# Patient Record
Sex: FEMALE | URBAN - METROPOLITAN AREA
[De-identification: names, ages, dates, MRNs, and addresses within clinical notes are randomized per-mention and may not be internally consistent; named-entity substitution may affect disease eponyms.]

---

## 2023-03-07 ENCOUNTER — HOSPITAL ENCOUNTER (OUTPATIENT)
Dept: PHYSICAL THERAPY | Age: 23
Setting detail: THERAPIES SERIES
Discharge: HOME | End: 2023-03-07

## 2023-03-07 VITALS — HEIGHT: 65 IN | WEIGHT: 155 LBS | BODY MASS INDEX: 25.83 KG/M2

## 2023-03-07 NOTE — PROGRESS NOTES
Laura Perrin [address],  [facility], [address], [address] [place] [address]. 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 Roberta Keller understands basic nutrition principles and has undergone caloric restriction in the past. She is in a better place nutrition wise and is ready to tackle nutrition hurdles. Discussed need to adequately fuel body so that small calorie deficit can occur. Discussed need for moderate, slow, sustainable weight loss is caloric deficit of 100-300 calories/day. Discussed ways to to do this. ASSESSMENT  Biochemical Data, Medical Tests and Procedures:    No results found for: LABA1C  No results found for: EAG    No results found for: CHOL  No results found for: TRIG  No results found for: HDL  No results found for: LDLCALC  No results found for: LABVLDL  No results found for: CHOLHDLRATIO    No results found for: WBC, HGB, HCT, MCV, PLT    No results found for: CREATININE, BUN, NA, K, CL, CO2      Medications: N/A    Anthropometric Measurements:  Height (inches):65 inches  Current Bodyweight (CBW): 155 lb   Competition weight (if applicable): would like to be around 145-150 lb to make auditions easier           In what sports do you participate in, if applicable? [employer] 1.  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  Saturday:no formal training   :no formal training    Injuries with details of rehab and dates:   Injury 1:N/A    Food and Nutrition History:  Food Recall    Breakfast  Consumed: bagel w strawberry cream cheese, coffee    AM Snack  Consumed: N/A    Lunch  Consumed: protein bar or mac n cheese + fruit    PM Snack  Consumed: rice bowl w/ chicken, coffee with bfast sandwich from [employer]  Consumed: same as PM snack    Bedtime Snack  Consumed: N/A    Frequency of meals away from home: 1-2/week    Caffeine intake: 2-3 cups coffee/day; does not tolerate tea    Hydration: cups/day  Water: maybe 40-60 ounces/day  Electrolyte replacement: none  Other:none    Dietary Habits   Who does most of the grocery shopping in your household? patient  Who does most of the cooking in your household? patient  Who decides menu/meal times in your household? patient      Iron Deficiency Screening  Do you eat red meat, fish and/or chicken? If yes, how often? Y - 1xday  Do you eat cereal, leafy greens and/or beans? Not often  If yes, how what and how often? N/A  If female, is your menstrual cycle very heavy? No  Do you feel fatigued or have trouble staying focused during the day? No  Do you ever feel your heart racing when it shouldn't be? No  Do you ever feel dizzy or lightheaded? No  Do you ever feel like your stamina is poor despite regular training?   No    Stress Level: 3-4    Amount of Sleep: bed between 10-11 PM; rise around 7 AM      INTERVENTION   Goal: consume adequate amounts of protein + produce   Goal:aim to structure 1-2 meals in plate method    Macronutrient Breakdown Recommendations  BMR: 1474 calories/day  TDEE:  - heavy exercise: 2543 calories/day  - athlete: 2800 calories/day  Total Calories:2543 calories/day  Protein: minimum 93 grams/day based on 0.6 grams/lb BW  Fluid: half BW in ounces + 12 ounces per hour of exercise daily  **limited ranges given d/t hx of disordered eating and pt preference  Handouts Provided: macronutrient guide, how to build a meal, electrolyte products, grocery shopping tips      MONITORING & EVALUATION  Follow up in 2-3 weeks pending patient's preference  Reassess goals listed above and adjust as appropriate  Follow up in person at [facility] and by email until then d/t schedule         Sherman Martin MS, RDN, LD  Sports Dietitian  [email or web address]